# Patient Record
Sex: MALE | Race: OTHER | HISPANIC OR LATINO | URBAN - METROPOLITAN AREA
[De-identification: names, ages, dates, MRNs, and addresses within clinical notes are randomized per-mention and may not be internally consistent; named-entity substitution may affect disease eponyms.]

---
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
## 2025-02-25 NOTE — ED ADULT TRIAGE NOTE - RESPIRATORY RATE (BREATHS/MIN)
Anesthesia Pre Eval Note    Anesthesia ROS/Med Hx          Pulmonary Review:    The patient is a smoker.    Neuro/Psych Review:       Positive for psychiatric history - schizophrenia and Bipolar    End/Other Review:    Positive for obesity class III - 40.00 - 49.99  Positive for substance use - stimulants, tobacco and cannabis/cannabinoids    Overall Review of Systems Comments:  -  Gluteal Crease abscess, Cocaine, MJ, TL, Smoker -some days    Hct-38    WBC-14      Relevant Problems   No relevant active problems       Physical Exam     Airway   Mallampati: II  TM Distance: >3 FB  Neck ROM: Full  TMJ Mobility: Good    Cardiovascular  Cardiovascular exam normal  Cardio Rhythm: Regular  Cardio Rate: Normal    Head Assessment  Head assessment: Normocephalic    General Assessment  General Assessment: Alert and oriented    Dental Exam  Dental exam normal    Pulmonary Exam  Pulmonary exam normal    Abdominal Exam  Abdominal exam normal      Anesthesia Plan:    ASA Status: 2Emergent    Anesthesia Type: MAC    Induction: Intravenous  Checklist  Reviewed: Lab Results, Nursing Notes, Consultations, DNR Status, Patient Summary, Beta Blocker Status, Care Everywhere, Outside Records, Allergies, Medications, Problem list, EKG, Past Med History, NPO Status, Anesthesia Record and Pregnancy Test Results  Consent/Risks Discussed Statement:  The proposed anesthetic plan, including its risks and benefits, have been discussed with the Patient along with the risks and benefits of alternatives. Questions were encouraged and answered and the patient and/or representative understands and agrees to proceed.        I discussed with the patient (and/or patient's legal representative) the risks and benefits of the proposed anesthesia plan, MAC, which may include services performed by other anesthesia providers.    Alternative anesthesia plans, if available, were reviewed with the patient (and/or patient's legal representative). Discussion has  been held with the patient (and/or patient's legal representative) regarding risks of anesthesia, which include  emergent situations that may require change in anesthesia plan.    The patient (and/or patient's legal representative) has indicated understanding, his/her questions have been answered, and he/she wishes to proceed with the planned anesthetic.    Blood Products: Not Anticipated     18